# Patient Record
Sex: FEMALE | Race: WHITE | NOT HISPANIC OR LATINO | Employment: FULL TIME | URBAN - METROPOLITAN AREA
[De-identification: names, ages, dates, MRNs, and addresses within clinical notes are randomized per-mention and may not be internally consistent; named-entity substitution may affect disease eponyms.]

---

## 2021-06-03 ENCOUNTER — TRANSCRIBE ORDERS (OUTPATIENT)
Dept: ADMINISTRATIVE | Facility: HOSPITAL | Age: 61
End: 2021-06-03

## 2021-06-03 DIAGNOSIS — R07.89 TIGHTNESS IN CHEST: ICD-10-CM

## 2021-06-03 DIAGNOSIS — R06.09 DYSPNEA ON EXERTION: Primary | ICD-10-CM

## 2021-06-11 ENCOUNTER — HOSPITAL ENCOUNTER (OUTPATIENT)
Dept: RESPIRATORY THERAPY | Facility: HOSPITAL | Age: 61
Discharge: HOME | End: 2021-06-11
Admitting: FAMILY MEDICINE

## 2021-06-11 DIAGNOSIS — R07.89 TIGHTNESS IN CHEST: ICD-10-CM

## 2021-06-11 DIAGNOSIS — R06.09 DYSPNEA ON EXERTION: ICD-10-CM

## 2021-06-11 PROCEDURE — 93018 CV STRESS TEST I&R ONLY: CPT | Performed by: INTERNAL MEDICINE

## 2021-06-11 PROCEDURE — 93016 CV STRESS TEST SUPVJ ONLY: CPT | Performed by: INTERNAL MEDICINE

## 2021-06-11 PROCEDURE — 93017 CV STRESS TEST TRACING ONLY: CPT

## 2021-06-16 LAB
BH CV STRESS BP STAGE 1: NORMAL
BH CV STRESS BP STAGE 2: NORMAL
BH CV STRESS DURATION MIN STAGE 1: 3
BH CV STRESS DURATION MIN STAGE 2: 3
BH CV STRESS DURATION MIN STAGE 3: 1
BH CV STRESS DURATION SEC STAGE 1: 0
BH CV STRESS DURATION SEC STAGE 2: 0
BH CV STRESS DURATION SEC STAGE 3: 0
BH CV STRESS GRADE STAGE 1: 10
BH CV STRESS GRADE STAGE 2: 12
BH CV STRESS GRADE STAGE 3: 14
BH CV STRESS HR STAGE 1: 125
BH CV STRESS HR STAGE 2: 130
BH CV STRESS HR STAGE 3: 156
BH CV STRESS METS STAGE 1: 5
BH CV STRESS METS STAGE 2: 7.5
BH CV STRESS METS STAGE 3: 10
BH CV STRESS PROTOCOL 1: NORMAL
BH CV STRESS RECOVERY BP: NORMAL MMHG
BH CV STRESS RECOVERY HR: 100 BPM
BH CV STRESS SPEED STAGE 1: 1.7
BH CV STRESS SPEED STAGE 2: 2.5
BH CV STRESS SPEED STAGE 3: 3.4
BH CV STRESS STAGE 1: 1
BH CV STRESS STAGE 2: 2
BH CV STRESS STAGE 3: 3
MAXIMAL PREDICTED HEART RATE: 162 BPM
PERCENT MAX PREDICTED HR: 96.3 %
STRESS BASELINE BP: NORMAL MMHG
STRESS BASELINE HR: 82 BPM
STRESS PERCENT HR: 113 %
STRESS POST ESTIMATED WORKLOAD: 10.1 METS
STRESS POST EXERCISE DUR MIN: 7 MIN
STRESS POST PEAK BP: NORMAL MMHG
STRESS POST PEAK HR: 156 BPM
STRESS TARGET HR: 138 BPM

## 2021-10-05 RX ORDER — MONTELUKAST SODIUM 10 MG/1
10 TABLET ORAL DAILY
COMMUNITY

## 2021-10-05 RX ORDER — ESTRADIOL 0.5 MG/1
0.5 TABLET ORAL NIGHTLY
COMMUNITY

## 2021-10-05 RX ORDER — POTASSIUM CHLORIDE 750 MG/1
10 TABLET, FILM COATED, EXTENDED RELEASE ORAL AS NEEDED
COMMUNITY

## 2021-10-05 RX ORDER — MULTIVITAMIN WITH IRON
250 TABLET ORAL NIGHTLY
COMMUNITY

## 2021-10-05 RX ORDER — CETIRIZINE HYDROCHLORIDE 10 MG/1
10 TABLET ORAL DAILY
COMMUNITY

## 2021-10-05 RX ORDER — DULOXETIN HYDROCHLORIDE 60 MG/1
60 CAPSULE, DELAYED RELEASE ORAL 2 TIMES DAILY
COMMUNITY

## 2021-10-05 RX ORDER — DEXTROAMPHETAMINE SACCHARATE, AMPHETAMINE ASPARTATE, DEXTROAMPHETAMINE SULFATE AND AMPHETAMINE SULFATE 2.5; 2.5; 2.5; 2.5 MG/1; MG/1; MG/1; MG/1
10 TABLET ORAL 2 TIMES DAILY
COMMUNITY

## 2021-10-05 RX ORDER — PHENOL 1.4 %
750 AEROSOL, SPRAY (ML) MUCOUS MEMBRANE DAILY
COMMUNITY

## 2021-10-05 RX ORDER — OMEPRAZOLE 20 MG/1
20 CAPSULE, DELAYED RELEASE ORAL NIGHTLY
COMMUNITY

## 2021-10-05 RX ORDER — HYDROCHLOROTHIAZIDE 12.5 MG/1
12.5 TABLET ORAL DAILY
COMMUNITY

## 2021-10-08 ENCOUNTER — LAB (OUTPATIENT)
Dept: LAB | Facility: HOSPITAL | Age: 61
End: 2021-10-08

## 2021-10-08 LAB — SARS-COV-2 ORF1AB RESP QL NAA+PROBE: NOT DETECTED

## 2021-10-08 PROCEDURE — U0004 COV-19 TEST NON-CDC HGH THRU: HCPCS

## 2021-10-08 PROCEDURE — C9803 HOPD COVID-19 SPEC COLLECT: HCPCS

## 2021-10-11 ENCOUNTER — ANESTHESIA (OUTPATIENT)
Dept: PERIOP | Facility: HOSPITAL | Age: 61
End: 2021-10-11

## 2021-10-11 ENCOUNTER — APPOINTMENT (OUTPATIENT)
Dept: GENERAL RADIOLOGY | Facility: HOSPITAL | Age: 61
End: 2021-10-11

## 2021-10-11 ENCOUNTER — ANESTHESIA EVENT (OUTPATIENT)
Dept: PERIOP | Facility: HOSPITAL | Age: 61
End: 2021-10-11

## 2021-10-11 ENCOUNTER — HOSPITAL ENCOUNTER (OUTPATIENT)
Facility: HOSPITAL | Age: 61
Setting detail: HOSPITAL OUTPATIENT SURGERY
Discharge: HOME | End: 2021-10-11
Attending: STUDENT IN AN ORGANIZED HEALTH CARE EDUCATION/TRAINING PROGRAM | Admitting: STUDENT IN AN ORGANIZED HEALTH CARE EDUCATION/TRAINING PROGRAM

## 2021-10-11 VITALS
BODY MASS INDEX: 35.08 KG/M2 | SYSTOLIC BLOOD PRESSURE: 133 MMHG | HEART RATE: 96 BPM | DIASTOLIC BLOOD PRESSURE: 82 MMHG | WEIGHT: 205.47 LBS | HEIGHT: 64 IN | RESPIRATION RATE: 14 BRPM | OXYGEN SATURATION: 95 % | TEMPERATURE: 97.5 F

## 2021-10-11 PROCEDURE — 73620 X-RAY EXAM OF FOOT: CPT

## 2021-10-11 PROCEDURE — C1713 ANCHOR/SCREW BN/BN,TIS/BN: HCPCS | Performed by: STUDENT IN AN ORGANIZED HEALTH CARE EDUCATION/TRAINING PROGRAM

## 2021-10-11 PROCEDURE — 25010000002 MIDAZOLAM PER 1 MG: Performed by: ANESTHESIOLOGY

## 2021-10-11 PROCEDURE — 25010000002 PROPOFOL 10 MG/ML EMULSION

## 2021-10-11 PROCEDURE — 25010000002 ROPIVACAINE PER 1 MG: Performed by: ANESTHESIOLOGY

## 2021-10-11 PROCEDURE — 76000 FLUOROSCOPY <1 HR PHYS/QHP: CPT

## 2021-10-11 PROCEDURE — 25010000002 ONDANSETRON PER 1 MG

## 2021-10-11 PROCEDURE — 76942 ECHO GUIDE FOR BIOPSY: CPT | Performed by: STUDENT IN AN ORGANIZED HEALTH CARE EDUCATION/TRAINING PROGRAM

## 2021-10-11 PROCEDURE — 25010000002 DEXAMETHASONE PER 1 MG

## 2021-10-11 PROCEDURE — 25010000002 FENTANYL CITRATE (PF) 50 MCG/ML SOLUTION: Performed by: ANESTHESIOLOGY

## 2021-10-11 DEVICE — IMPLANTABLE DEVICE
Type: IMPLANTABLE DEVICE | Site: FOOT | Status: FUNCTIONAL
Brand: FUSEFORCE

## 2021-10-11 DEVICE — IMPLANTABLE DEVICE
Type: IMPLANTABLE DEVICE | Site: FOOT | Status: FUNCTIONAL
Brand: CHARLOTTE

## 2021-10-11 DEVICE — IMPLANTABLE DEVICE
Type: IMPLANTABLE DEVICE | Site: FOOT | Status: FUNCTIONAL
Brand: MICROAIRE®

## 2021-10-11 RX ORDER — FENTANYL CITRATE 50 UG/ML
50 INJECTION, SOLUTION INTRAMUSCULAR; INTRAVENOUS
Status: DISCONTINUED | OUTPATIENT
Start: 2021-10-11 | End: 2021-10-11 | Stop reason: HOSPADM

## 2021-10-11 RX ORDER — PROPOFOL 10 MG/ML
VIAL (ML) INTRAVENOUS AS NEEDED
Status: DISCONTINUED | OUTPATIENT
Start: 2021-10-11 | End: 2021-10-11 | Stop reason: SURG

## 2021-10-11 RX ORDER — DEXAMETHASONE SODIUM PHOSPHATE 4 MG/ML
INJECTION, SOLUTION INTRA-ARTICULAR; INTRALESIONAL; INTRAMUSCULAR; INTRAVENOUS; SOFT TISSUE
Status: COMPLETED | OUTPATIENT
Start: 2021-10-11 | End: 2021-10-11

## 2021-10-11 RX ORDER — MEPERIDINE HYDROCHLORIDE 25 MG/ML
12.5 INJECTION INTRAMUSCULAR; INTRAVENOUS; SUBCUTANEOUS
Status: DISCONTINUED | OUTPATIENT
Start: 2021-10-11 | End: 2021-10-11 | Stop reason: HOSPADM

## 2021-10-11 RX ORDER — ONDANSETRON 2 MG/ML
4 INJECTION INTRAMUSCULAR; INTRAVENOUS ONCE AS NEEDED
Status: DISCONTINUED | OUTPATIENT
Start: 2021-10-11 | End: 2021-10-11 | Stop reason: HOSPADM

## 2021-10-11 RX ORDER — NALOXONE HCL 0.4 MG/ML
0.4 VIAL (ML) INJECTION AS NEEDED
Status: DISCONTINUED | OUTPATIENT
Start: 2021-10-11 | End: 2021-10-11 | Stop reason: HOSPADM

## 2021-10-11 RX ORDER — LIDOCAINE HYDROCHLORIDE 20 MG/ML
INJECTION, SOLUTION EPIDURAL; INFILTRATION; INTRACAUDAL; PERINEURAL AS NEEDED
Status: DISCONTINUED | OUTPATIENT
Start: 2021-10-11 | End: 2021-10-11 | Stop reason: SURG

## 2021-10-11 RX ORDER — SODIUM CHLORIDE 0.9 % (FLUSH) 0.9 %
10 SYRINGE (ML) INJECTION AS NEEDED
Status: DISCONTINUED | OUTPATIENT
Start: 2021-10-11 | End: 2021-10-11 | Stop reason: HOSPADM

## 2021-10-11 RX ORDER — MIDAZOLAM HYDROCHLORIDE 1 MG/ML
1 INJECTION INTRAMUSCULAR; INTRAVENOUS
Status: DISCONTINUED | OUTPATIENT
Start: 2021-10-11 | End: 2021-10-11 | Stop reason: HOSPADM

## 2021-10-11 RX ORDER — FENTANYL CITRATE 50 UG/ML
INJECTION, SOLUTION INTRAMUSCULAR; INTRAVENOUS AS NEEDED
Status: DISCONTINUED | OUTPATIENT
Start: 2021-10-11 | End: 2021-10-11

## 2021-10-11 RX ORDER — DROPERIDOL 2.5 MG/ML
0.62 INJECTION, SOLUTION INTRAMUSCULAR; INTRAVENOUS ONCE AS NEEDED
Status: DISCONTINUED | OUTPATIENT
Start: 2021-10-11 | End: 2021-10-11 | Stop reason: HOSPADM

## 2021-10-11 RX ORDER — ROPIVACAINE HYDROCHLORIDE 5 MG/ML
INJECTION, SOLUTION EPIDURAL; INFILTRATION; PERINEURAL
Status: COMPLETED | OUTPATIENT
Start: 2021-10-11 | End: 2021-10-11

## 2021-10-11 RX ORDER — FENTANYL CITRATE 50 UG/ML
25 INJECTION, SOLUTION INTRAMUSCULAR; INTRAVENOUS
Status: DISCONTINUED | OUTPATIENT
Start: 2021-10-11 | End: 2021-10-11 | Stop reason: HOSPADM

## 2021-10-11 RX ORDER — LABETALOL HYDROCHLORIDE 5 MG/ML
5 INJECTION, SOLUTION INTRAVENOUS
Status: DISCONTINUED | OUTPATIENT
Start: 2021-10-11 | End: 2021-10-11 | Stop reason: HOSPADM

## 2021-10-11 RX ORDER — ONDANSETRON 2 MG/ML
INJECTION INTRAMUSCULAR; INTRAVENOUS AS NEEDED
Status: DISCONTINUED | OUTPATIENT
Start: 2021-10-11 | End: 2021-10-11 | Stop reason: SURG

## 2021-10-11 RX ORDER — SODIUM CHLORIDE, SODIUM LACTATE, POTASSIUM CHLORIDE, CALCIUM CHLORIDE 600; 310; 30; 20 MG/100ML; MG/100ML; MG/100ML; MG/100ML
INJECTION, SOLUTION INTRAVENOUS CONTINUOUS PRN
Status: DISCONTINUED | OUTPATIENT
Start: 2021-10-11 | End: 2021-10-11 | Stop reason: SURG

## 2021-10-11 RX ORDER — MIDAZOLAM HYDROCHLORIDE 1 MG/ML
INJECTION INTRAMUSCULAR; INTRAVENOUS
Status: COMPLETED | OUTPATIENT
Start: 2021-10-11 | End: 2021-10-11

## 2021-10-11 RX ORDER — SODIUM CHLORIDE, SODIUM LACTATE, POTASSIUM CHLORIDE, CALCIUM CHLORIDE 600; 310; 30; 20 MG/100ML; MG/100ML; MG/100ML; MG/100ML
1000 INJECTION, SOLUTION INTRAVENOUS CONTINUOUS
Status: DISCONTINUED | OUTPATIENT
Start: 2021-10-11 | End: 2021-10-11 | Stop reason: HOSPADM

## 2021-10-11 RX ORDER — FENTANYL CITRATE 50 UG/ML
INJECTION, SOLUTION INTRAMUSCULAR; INTRAVENOUS
Status: COMPLETED | OUTPATIENT
Start: 2021-10-11 | End: 2021-10-11

## 2021-10-11 RX ORDER — DEXAMETHASONE SODIUM PHOSPHATE 4 MG/ML
INJECTION, SOLUTION INTRA-ARTICULAR; INTRALESIONAL; INTRAMUSCULAR; INTRAVENOUS; SOFT TISSUE AS NEEDED
Status: DISCONTINUED | OUTPATIENT
Start: 2021-10-11 | End: 2021-10-11 | Stop reason: SURG

## 2021-10-11 RX ORDER — FLUMAZENIL 0.1 MG/ML
0.2 INJECTION INTRAVENOUS AS NEEDED
Status: DISCONTINUED | OUTPATIENT
Start: 2021-10-11 | End: 2021-10-11 | Stop reason: HOSPADM

## 2021-10-11 RX ORDER — IBUPROFEN 400 MG/1
600 TABLET ORAL ONCE AS NEEDED
Status: DISCONTINUED | OUTPATIENT
Start: 2021-10-11 | End: 2021-10-11 | Stop reason: HOSPADM

## 2021-10-11 RX ADMIN — FENTANYL CITRATE 25 MCG: 50 INJECTION, SOLUTION INTRAMUSCULAR; INTRAVENOUS at 10:23

## 2021-10-11 RX ADMIN — ONDANSETRON 4 MG: 2 INJECTION INTRAMUSCULAR; INTRAVENOUS at 12:08

## 2021-10-11 RX ADMIN — FENTANYL CITRATE 25 MCG: 50 INJECTION, SOLUTION INTRAMUSCULAR; INTRAVENOUS at 10:49

## 2021-10-11 RX ADMIN — DEXAMETHASONE SODIUM PHOSPHATE 4 MG: 4 INJECTION, SOLUTION INTRA-ARTICULAR; INTRALESIONAL; INTRAMUSCULAR; INTRAVENOUS; SOFT TISSUE at 10:23

## 2021-10-11 RX ADMIN — CEFAZOLIN SODIUM 2 G: 1 INJECTION, POWDER, FOR SOLUTION INTRAMUSCULAR; INTRAVENOUS at 10:38

## 2021-10-11 RX ADMIN — LIDOCAINE HYDROCHLORIDE 70 MG: 20 INJECTION, SOLUTION EPIDURAL; INFILTRATION; INTRACAUDAL; PERINEURAL at 10:36

## 2021-10-11 RX ADMIN — SODIUM CHLORIDE, POTASSIUM CHLORIDE, SODIUM LACTATE AND CALCIUM CHLORIDE 1000 ML: 600; 310; 30; 20 INJECTION, SOLUTION INTRAVENOUS at 08:03

## 2021-10-11 RX ADMIN — PROPOFOL 200 MG: 10 INJECTION, EMULSION INTRAVENOUS at 10:36

## 2021-10-11 RX ADMIN — FENTANYL CITRATE 50 MCG: 50 INJECTION, SOLUTION INTRAMUSCULAR; INTRAVENOUS at 10:39

## 2021-10-11 RX ADMIN — MIDAZOLAM 2 MG: 1 INJECTION INTRAMUSCULAR; INTRAVENOUS at 10:23

## 2021-10-11 RX ADMIN — SODIUM CHLORIDE, SODIUM LACTATE, POTASSIUM CHLORIDE, AND CALCIUM CHLORIDE: .6; .31; .03; .02 INJECTION, SOLUTION INTRAVENOUS at 10:28

## 2021-10-11 RX ADMIN — ROPIVACAINE HYDROCHLORIDE 5 ML: 5 INJECTION EPIDURAL; INFILTRATION; PERINEURAL at 10:23

## 2021-10-11 RX ADMIN — DEXAMETHASONE SODIUM PHOSPHATE 8 MG: 4 INJECTION, SOLUTION INTRAMUSCULAR; INTRAVENOUS at 10:49

## 2021-10-11 NOTE — DISCHARGE INSTRUCTIONS
Pre op instructions  Strict NWB on the right foot  Use knee scooter for assistance  Keep the dressing clean, dry intact  All pain meds sent from the office  Will f/u within 1 week with me (DR. Varela)

## 2021-10-11 NOTE — ANESTHESIA POSTPROCEDURE EVALUATION
Patient: Radha Larios    Procedure Summary     Date: 10/11/21 Room / Location: [facility] OR 09 / [facility] MAIN OR    Anesthesia Start: 1028 Anesthesia Stop: 1253    Procedures:       BUNIONECTOMY (Right Foot)      2ND METATARSAL WEIL OSTEOTOMY (Right Foot)      2ND AND 3RD HAMMER TOE REPAIR (Right Toes) Diagnosis:       Acquired hallux valgus of right foot      Acquired hammer toe of right foot      Metatarsalgia of right foot      Right ankle pain      (Acquired hallux valgus of right foot [M20.11])      (Acquired hammer toe of right foot [M20.41])      (Metatarsalgia of right foot [M77.41])      (Right ankle pain [M25.571])    Surgeons: Kary Varela DPM Provider: Riaz Ying MD    Anesthesia Type: general with block ASA Status: 2          Anesthesia Type: general with block    Vitals  Vitals Value Taken Time   /88 10/11/21 1336   Temp 97.8 °F (36.6 °C) 10/11/21 1248   Pulse 100 10/11/21 1337   Resp 16 10/11/21 1333   SpO2 99 % 10/11/21 1337   Vitals shown include unvalidated device data.        Post Anesthesia Care and Evaluation    Patient location during evaluation: PACU  Patient participation: complete - patient participated  Level of consciousness: awake  Pain scale: See nurse's notes for pain score.  Pain management: adequate  Airway patency: patent  Anesthetic complications: No anesthetic complications  PONV Status: none  Cardiovascular status: acceptable  Respiratory status: acceptable  Hydration status: acceptable    Comments: Patient seen and examined postoperatively; vital signs stable; SpO2 greater than or equal to 90%; cardiopulmonary status stable; nausea/vomiting adequately controlled; pain adequately controlled; no apparent anesthesia complications; patient discharged from anesthesia care when discharge criteria were met

## 2021-10-11 NOTE — OP NOTE
BUNIONECTOMY, FOOT ARTHRODESIS, HAMMER TOE REPAIR  Procedure Report    Patient Name:  Radha Larios  YOB: 1960    Date of Surgery:  10/11/2021     Indications:   Patient is a 60-year-old female with longstanding history of painful bunion deformity to the right foot.  Patient also presents with painful hammer digit deformities to the right second and third digit.  Patient symptoms have progressed and failed all conservative treatment options at this point.  After explaining risks and benefits associated with continued conservative management versus surgical intervention patient elects for surgical intervention at this time.    Pre-op Diagnosis:   Acquired hallux valgus of right foot [M20.11]  Acquired hammer toe of right foot [M20.41]  Metatarsalgia of right foot [M77.41]  Right ankle pain [M25.571]       Post-Op Diagnosis Codes:     * Acquired hallux valgus of right foot [M20.11]     * Acquired hammer toe of right foot [M20.41]     * Metatarsalgia of right foot [M77.41]     * Right ankle pain [M25.571]    Procedure/CPT® Codes:      Procedure(s):  BUNIONECTOMY  2ND METATARSAL WEIL OSTEOTOMY  2ND AND 3RD HAMMER TOE REPAIR    Staff:  Surgeon(s):  Kary Varela DPM Bibeau, Luc A, DPM         Anesthesia: General with Block    Estimated Blood Loss: 10 mL    Implants:    Implant Name Type Inv. Item Serial No.  Lot No. LRB No. Used Action   SYS FIX HND FUSEFORCE NITNL [id number] - [id number] Implant SYS FIX HND FUSEFORCE NITNL [id number]  [employer] [id number] Right 1 Implanted   SCRW DARCO SNP/OFF 2X12MM - [id number] Implant SCRW DARCO SNP/OFF 2X12MM  Updater TECH   Right 2 Implanted   K-Wire     [id number] Right 2 Implanted       Specimen:          None        Findings: Hypertrophic medial first metatarsal head with rigid flexion contractures of the second and third digit, right foot    Complications: None    Description of Procedure:   Patient was brought to the operating  room placed on the operating table in the supine position.  Timeout was performed.  Proper patient identification and site and procedure were confirmed.  All or personnel were in agreement.  Once general anesthesia was induced a nonsterile pneumatic ankle tourniquet was placed about the right ankle.  The right foot was then scrubbed prepped and draped in the usual aseptic manner.  The right foot was then exsanguinated with an Esmarch bandage and the tourniquet was inflated to 250 mmHg at this time.    Attention was then directed to the dorsal aspect of the first metatarsal phalangeal joint where a linear incision was made just medial to the extensor hallucis longus tendon.  This incision was deepened via sharp and blunt dissection.  All neurovascular structures were identified and retracted away from the operative field.  All traversing vessels were cauterized accordingly.  Care was taken to identify and mobilize the extensor hallucis longus tendon and retract laterally to avoid incidental transection.  Linear capsulotomy was then performed in line with the skin incision.  All capsular tissues were reflected off the first metatarsal head.  Using a sagittal saw the medial eminence of the first metatarsal head was performed and removed from the operative field.  At this time a lateral release was then performed.  Retracting extensor hallucis longus tendon medially the first interspace was bluntly dissected and a lateral capsulotomy was made.  The collateral ligaments as well as the abductor hallucis tendon was transected allowing for soft tissue correction and manipulation of the hallux out of valgus.  A guidewire was then placed from the medial aspect of the first metatarsal head angled in the transverse plane towards the fourth metatarsal phalangeal joint.  Distal metatarsal osteotomy was then made.  The guidewire was then removed.  The capital fragment was then translated laterally and temporarily fixated with a K  wire.  Using a staple provided by [facility] capital fragment and distal metatarsal shaft were predrilled and the staple was inserted across the level of the osteotomy without incident.  Using C-arm guided fluoroscopy confirmation of compression across the level of the osteotomy as well as correction of the deformity was confirmed.  The intermetatarsal angle was noted to be nearly 0 and there was no noted dorsiflexion or plantarflexion appreciated in the sagittal plane.  Site was then copiously irrigated and flushed with normal sterile saline.  Given the positioning of the second digit after subsequent hammertoe correction there would be noted abutment of the hallux to the second digit.  At this time it was decided to perform a medial capsulorrhaphy.  After performing a medial capsulorrhaphy the capsular tissues allowed for the hallux made in the rectus and plantigrade position.  The site was then copiously irrigated and flushed with normal sterile saline capsular tissues were repaired with 2-0 Vicryl.  Subcutaneous anatomy was reapproximated with 3-0 Vicryl subcuticular anatomy was reapproximated with 4-0 Monocryl and skin was closed with 4-0 nylon.    Attention was then directed over the dorsal aspect of the second digit.  A linear incision was made from the level of the proximal interphalangeal joint extending proximally over the second metatarsal phalangeal joint.  This incision was deepened via sharp and blunt dissection.  All neurovascular structures were identified and retracted away from the operative field.  All traversing vessels were cauterized accordingly.  At the level of the proximal interphalangeal joint the extensor hallucis longus tendon was transected and all capsular tissues were reflected off the head of the proximal phalanx.  The extensor digitorum longus tendon was mobilized and the head of the proximal phalanx was cut with a sagittal saw and removed from the operative field.  Blunt  dissection was then used to locate the second metatarsal phalangeal joint.  A linear capsulotomy was then made at this level and all capsular tissues were reflected off the head of the second metatarsal.  A McClamary elevator was used to remove all plantar soft tissue attachments.  Using a sagittal saw at approximately 30 degree angle to the long axis of the second metatarsal and metatarsal shortening osteotomy was performed.  The capital fragment was then translated proximally approximately 4 mm.  Using the gladiator elevator is counterpressure a snap off screw provided by [employer] was used to secure the capital fragment.  Confirmation of the positioning of the capital fragment was confirmed using C-arm guided fluoroscopy.  The dorsal shelf of the second metatarsal was then sharply excised with a rongeur and removed from the operative field.  At this time there was noted bowstringing of the extensor digitorum longus tendon despite shortening of the second ray.  At this time was decided to go ahead and perform a Z-lengthening of the extensor digitorum longus tendon. Extensor digitorum longus tendon was then repaired with 2-0 Vicryl under anatomic tension.  Using a K wire the head of the proximal phalanx was predrilled and a K wire was antegrade drilled through the distal aspect of the second digit and retrograded through the proximal phalanx back into the head of the second metatarsal.  Under C-arm guidance fluoroscopy proper positioning of the second digit was confirmed noting that the second digit was in a rectus and plantigrade position.  The site was then copiously irrigated and flushed with normal sterile saline.  And extensor digitorum longus tendon was repaired with 2-0 Vicryl.  Subcutaneous area was reapproximated with 3-0 Vicryl.  Subcuticular anatomy was reapproximated with 4-0 Monocryl.  Skin was reapproximated with 3-0 nylon.    Attention was then directed to the dorsal aspect of the third digit at  the level of the proximal interphalangeal joint.  An elliptical incision was made at this level.  This incision was deepened via sharp and blunt dissection.  All traversing vessels were cauterized accordingly.  The small ellipse of tissue was then excised removed from the operative field.  Blunt dissection was used to carry down to the level of the proximal interphalangeal joint.  Extensor digitorum longus tendon was then transected transversely at the level of proximal interphalangeal joint all capsular tissues were reflected off the head of the proximal phalanx.  Once all capsular tissues were reflected a sagittal saw was used to excise the head of the proximal phalanx.  This was then removed from the operative field.  Using a K wire was drilled out the distal aspect of the second digit and advanced proximally across the proximal phalanx and the  third metatarsal phalangeal joint.  Using C-arm guided fluoroscopy proper positioning was confirmed and the third digit was noted to be in a plantigrade rectus position.  The extensor digitorum longus tendon was then repaired with 2-0 Vicryl.  The site was then copiously irrigated and flushed with normal sterile saline the skin was closed with 3-0 nylon.    The right foot was then dressed with Xeroform 4 x 4 gauze Kerlix copious amounts of cast padding and a posterior splint secured with 4 and 6 inch Ace wrap.  An ABD was then used to cover the digits.  The tourniquet was dropped prior to applying the dressings and hyperemic response was appreciated to the lesser digits.    The patient tolerated the procedure well was transferred recovery room with all vital signs stable and vascular status intact to the right foot.  Following postoperative monitoring patient be discharged home with medications discussed in preoperative history and physical and follow-up with Dr. Varela in 1 week.       was responsible for performing the following activities: Placing Dressing and their  skilled assistance was necessary for the success of this case.    Tony Collins DPM     Date: 10/11/2021  Time: 17:21 EDT

## 2021-10-11 NOTE — ANESTHESIA PROCEDURE NOTES
Airway  Urgency: elective    Date/Time: 10/11/2021 10:37 AM    General Information and Staff    Patient location during procedure: OR  Anesthesiologist: Riaz Ying MD  CRNA: Barbara Elliott CAA    Indications and Patient Condition  Indications for airway management: airway protection    Preoxygenated: yes  Mask difficulty assessment: 0 - not attempted    Final Airway Details  Final airway type: supraglottic airway      Successful airway: unique  Size 4    Number of attempts at approach: 1  Assessment: lips, teeth, and gum same as pre-op and atraumatic intubation

## 2021-10-11 NOTE — ANESTHESIA PROCEDURE NOTES
Peripheral Block      Patient reassessed immediately prior to procedure    Patient location during procedure: pre-op  Start time: 10/11/2021 10:10 AM  Stop time: 10/11/2021 10:24 AM  Reason for block: at surgeon's request and post-op pain management  Performed by  Anesthesiologist: Riaz Ying MD  Assisted by: Quique Rhodes RN  Preanesthetic Checklist  Completed: patient identified, IV checked, site marked, risks and benefits discussed, surgical consent, monitors and equipment checked, pre-op evaluation and timeout performed  Prep:  Pt Position: supine  Sterile barriers:cap, mask, gloves and washed/disinfected hands  Prep: ChloraPrep  Patient monitoring: blood pressure monitoring, continuous pulse oximetry and EKG  Procedure  Sedation:yes  Performed under: local infiltration  Guidance:ultrasound guided  Images:still images obtained, printed/placed on chart    Laterality:right  Block Type:adductor canal block and popliteal  Injection Technique:single-shot  Needle Type:echogenic  Needle Gauge:20 G  Resistance on Injection: none  Sedation medications used: midazolam (VERSED) injection, 2 mg  fentaNYL citrate (PF) (SUBLIMAZE) injection, 25 mcg  Medications Used: dexamethasone (DECADRON) injection, 4 mg  ropivacaine (NAROPIN) 0.5 % injection, 5 mL  Med admintered at 10/11/2021 10:23 AM      Medications  Comment:Popliteal 30ml, adductor canal 30ml    Post Assessment  Injection Assessment: negative aspiration for heme, no paresthesia on injection and incremental injection  Patient Tolerance:comfortable throughout block  Complications:no

## 2021-10-11 NOTE — ANESTHESIA PREPROCEDURE EVALUATION
Anesthesia Evaluation     Patient summary reviewed   NPO Solid Status: > 8 hours  NPO Liquid Status: > 8 hours           Airway   Mallampati: II  TM distance: >3 FB  Neck ROM: full  No difficulty expected  Dental - normal exam     Pulmonary - normal exam   (+) asthma,sleep apnea,   Cardiovascular - normal exam        Neuro/Psych  GI/Hepatic/Renal/Endo    (+)  GERD,      Musculoskeletal     Abdominal  - normal exam    Bowel sounds: normal.   Substance History      OB/GYN          Other   arthritis,                      Anesthesia Plan    ASA 2     general with block     intravenous induction     Anesthetic plan, all risks, benefits, and alternatives have been provided, discussed and informed consent has been obtained with: patient.

## 2021-10-11 NOTE — BRIEF OP NOTE
BUNIONECTOMY, FOOT ARTHRODESIS, HAMMER TOE REPAIR  Progress Note    Radha Larios  10/11/2021    Pre-op Diagnosis:   Acquired hallux valgus of right foot [M20.11]  Acquired hammer toe of right foot [M20.41]  Metatarsalgia of right foot [M77.41]  Right ankle pain [M25.571]       Post-Op Diagnosis Codes:     * Acquired hallux valgus of right foot [M20.11]     * Acquired hammer toe of right foot [M20.41]     * Metatarsalgia of right foot [M77.41]     * Right ankle pain [M25.571]    Procedure/CPT® Codes:        Procedure(s):  BUNIONECTOMY  2ND METATARSAL WEIL OSTEOTOMY  2ND AND 3RD HAMMER TOE REPAIR    Surgeon(s):  Kary Varela DPM Bibeau, Luc A, DPM    Anesthesia: General with Block    Staff:   Circulator: Nirali Messer RN; Asaf Johnson RN  Scrub Person: Cecelia Montesinos; Wander Otero  Vendor Representative: Patel Maloney         Estimated Blood Loss: minimal    Urine Voided: * No values recorded between 10/11/2021 10:28 AM and 10/11/2021 12:43 PM *    Specimens:                None          Drains: * No LDAs found *    Findings: see op report    Complications: none       was responsible for performing the following activities: Suction and Irrigation and their skilled assistance was necessary for the success of this case.    Kary Varela DPM     Date: 10/11/2021  Time: 12:43 EDT

## (undated) DEVICE — INTENDED FOR TISSUE SEPARATION, AND OTHER PROCEDURES THAT REQUIRE A SHARP SURGICAL BLADE TO PUNCTURE OR CUT.: Brand: BARD-PARKER ® SAFETYLOCK CARBON RIB-BACK BLADES

## (undated) DEVICE — NDL HYPO PRECISIONGLIDE/REG 18G 1IN PNK

## (undated) DEVICE — SUT MNCRYL 4/0 PS2 18 IN

## (undated) DEVICE — SUT ETHLN 2/0 PS 18IN 585H

## (undated) DEVICE — BNDG ELAS CO-FLEX SLF ADHR 4IN5YD LF STRL

## (undated) DEVICE — BANDAGE,GAUZE,BULKEE II,4.5"X4.1YD,STRL: Brand: MEDLINE

## (undated) DEVICE — GOWN,REINFORCE,POLY,SIRUS,BREATH SLV,XLG: Brand: MEDLINE

## (undated) DEVICE — SUT ETHLN 4/0 FS2 18IN 662H

## (undated) DEVICE — SUT PROLN 4/0 PS2 18IN 8682G

## (undated) DEVICE — GLV SURG SIGNATURE ESSENTIAL PF LTX SZ6.5

## (undated) DEVICE — BNDG ESMARK 4IN 12FT LF STRL BLU

## (undated) DEVICE — KT SURG TURNOVER 050

## (undated) DEVICE — DRSNG WND GZ CURAD OIL EMULSION 3X3IN STRL

## (undated) DEVICE — PK EXTREM 50

## (undated) DEVICE — BNDG ELAS ELITE V/CLOSE 4IN 5YD LF STRL

## (undated) DEVICE — SUT VIC 3/0 CT2 27IN J232H

## (undated) DEVICE — BNDG ELAS ELITE V/CLOSE 3IN 5YD LF STRL

## (undated) DEVICE — 1010 S-DRAPE TOWEL DRAPE 10/BX: Brand: STERI-DRAPE™

## (undated) DEVICE — SOL IRRIG NACL 1000ML

## (undated) DEVICE — SUT MNCRYL 3/0 PS2 18IN Y497G

## (undated) DEVICE — SUT ETHILON NLY PS/1 3/0 45CM BLK

## (undated) DEVICE — SUT VIC 3/0 SH 27IN J416H

## (undated) DEVICE — BNDG GZ SOF-FORM CONFRM 3X75IN LF STRL

## (undated) DEVICE — SPNG GZ AVANT 6PLY 4X4IN STRL PK/2

## (undated) DEVICE — CUFF TOURNI 1BLADDER 1PRT 18IN STRL

## (undated) DEVICE — GRD PIN WHT 0.45

## (undated) DEVICE — UNDERGLV SURG BIOGEL INDICATOR LTX PF 7

## (undated) DEVICE — MICRO SAGITTAL BLADE (9.5 X 0.4 X 25.5MM)

## (undated) DEVICE — INTENDED FOR TISSUE SEPARATION, AND OTHER PROCEDURES THAT REQUIRE A SHARP SURGICAL BLADE TO PUNCTURE OR CUT.: Brand: BARD-PARKER ® CARBON RIB-BACK BLADES

## (undated) DEVICE — BNDG GZ SOF-FORM CONFRM 2X75IN LF STRL

## (undated) DEVICE — DRSNG SURESITE WNDW 2.38X2.75

## (undated) DEVICE — K-WIRE BLUNT/TROCAR
Type: IMPLANTABLE DEVICE | Site: FOOT | Status: NON-FUNCTIONAL
Removed: 2021-10-11

## (undated) DEVICE — SLV SCD CALF HEMOFORCE DVT THERP REPROC MD

## (undated) DEVICE — SOLUTION,WATER,IRRIGATION,1000ML,STERILE: Brand: MEDLINE

## (undated) DEVICE — SUT ETHLN 3/0 PS1 18IN 1663H

## (undated) DEVICE — BANDAGE,GAUZE,CONFORMING,1"X75",STRL,LF: Brand: MEDLINE

## (undated) DEVICE — DECANTER: Brand: UNBRANDED

## (undated) DEVICE — CUSTOM PACK: Brand: UNBRANDED

## (undated) DEVICE — MICRO SAGITTAL BLADE (4.1 X 0.4 X 25.5MM)

## (undated) DEVICE — PAD UNDERCAST WYTEX 4IN 4YD LF STRL

## (undated) DEVICE — SUT VIC FS2 4/0 27IN J422H

## (undated) DEVICE — SUT VIC 2/0 CT2 27IN J269H